# Patient Record
Sex: MALE | Race: OTHER | HISPANIC OR LATINO | Employment: FULL TIME | URBAN - METROPOLITAN AREA
[De-identification: names, ages, dates, MRNs, and addresses within clinical notes are randomized per-mention and may not be internally consistent; named-entity substitution may affect disease eponyms.]

---

## 2020-12-08 PROBLEM — R00.1 BRADYCARDIA: Status: ACTIVE | Noted: 2019-09-27

## 2020-12-08 PROBLEM — I10 ESSENTIAL HYPERTENSION: Status: ACTIVE | Noted: 2020-12-08

## 2020-12-08 PROBLEM — I50.22 CHRONIC SYSTOLIC HEART FAILURE: Status: ACTIVE | Noted: 2019-09-27

## 2020-12-08 PROBLEM — E78.5 HYPERLIPIDEMIA: Status: ACTIVE | Noted: 2020-12-08

## 2020-12-08 PROBLEM — I25.10 CORONARY ARTERY DISEASE INVOLVING NATIVE CORONARY ARTERY OF NATIVE HEART WITHOUT ANGINA PECTORIS: Status: ACTIVE | Noted: 2019-09-27

## 2023-09-07 ENCOUNTER — PATIENT OUTREACH (OUTPATIENT)
Dept: ADMINISTRATIVE | Facility: HOSPITAL | Age: 73
End: 2023-09-07

## 2023-09-07 NOTE — PROGRESS NOTES
Non-compliant report chart audits for CMS/[facility] CANCER SCREENING. Chart review completed for HM test overdue (mammograms, Colonoscopies, pap smears, DM labs, and/or EYE EXAMs)      Care Everywhere and media, updates requested and reviewed.      Clinical Summary under Care Everywhere notes that pts last Cscope was 11/13/2017, need to confirm with patient and request report

## 2024-03-03 PROBLEM — R42 DIZZINESS: Status: ACTIVE | Noted: 2024-03-03

## 2024-03-03 PROBLEM — R94.31 ABNORMAL ECG: Status: ACTIVE | Noted: 2024-03-03

## 2024-03-06 PROBLEM — Z95.1 HX OF CABG: Status: ACTIVE | Noted: 2024-03-06
